# Patient Record
Sex: MALE | Race: WHITE | NOT HISPANIC OR LATINO | URBAN - NONMETROPOLITAN AREA
[De-identification: names, ages, dates, MRNs, and addresses within clinical notes are randomized per-mention and may not be internally consistent; named-entity substitution may affect disease eponyms.]

---

## 2018-06-27 ENCOUNTER — SURGERY (OUTPATIENT)
Dept: URBAN - NONMETROPOLITAN AREA SURGERY 7 | Facility: SURGERY | Age: 68
End: 2018-06-27
Payer: COMMERCIAL

## 2018-06-27 RX ORDER — OFLOXACIN 3 MG/ML
0.3 % SOLUTION/ DROPS OPHTHALMIC
Qty: 1 | Refills: 0 | Status: INACTIVE
Start: 2018-06-27 | End: 2018-10-01

## 2018-06-27 ASSESSMENT — INTRAOCULAR PRESSURE
OS: 17
OD: 17

## 2018-06-28 ENCOUNTER — POST OP (OUTPATIENT)
Dept: URBAN - NONMETROPOLITAN AREA CLINIC 6 | Facility: CLINIC | Age: 68
End: 2018-06-28

## 2018-06-28 DIAGNOSIS — Z96.1 PRESENCE OF INTRAOCULAR LENS: Primary | ICD-10-CM

## 2018-06-28 PROCEDURE — 99024 POSTOP FOLLOW-UP VISIT: CPT | Performed by: OPTOMETRIST

## 2018-06-28 ASSESSMENT — INTRAOCULAR PRESSURE
OS: 15
OD: 16

## 2018-07-11 ENCOUNTER — POST-OPERATIVE VISIT (OUTPATIENT)
Dept: URBAN - NONMETROPOLITAN AREA CLINIC 10 | Facility: CLINIC | Age: 68
End: 2018-07-11
Payer: MEDICAID

## 2018-07-11 DIAGNOSIS — Z09 ENCNTR FOR F/U EXAM AFT TRTMT FOR COND OTH THAN MALIG NEOPLM: Primary | ICD-10-CM

## 2018-07-11 PROCEDURE — 99024 POSTOP FOLLOW-UP VISIT: CPT | Performed by: OPTOMETRIST

## 2018-07-11 ASSESSMENT — INTRAOCULAR PRESSURE
OS: 17
OD: 21

## 2018-10-01 ENCOUNTER — FOLLOW UP ESTABLISHED (OUTPATIENT)
Dept: URBAN - NONMETROPOLITAN AREA CLINIC 6 | Facility: CLINIC | Age: 68
End: 2018-10-01
Payer: COMMERCIAL

## 2018-10-01 DIAGNOSIS — H52.4 PRESBYOPIA: ICD-10-CM

## 2018-10-01 DIAGNOSIS — H52.223 REGULAR ASTIGMATISM, BILATERAL: ICD-10-CM

## 2018-10-01 PROCEDURE — 92014 COMPRE OPH EXAM EST PT 1/>: CPT | Performed by: OPTOMETRIST

## 2018-10-01 PROCEDURE — 92015 DETERMINE REFRACTIVE STATE: CPT | Performed by: OPTOMETRIST

## 2018-10-01 RX ORDER — TIMOLOL MALEATE 5 MG/ML
0.5 % SOLUTION/ DROPS OPHTHALMIC
Qty: 1 | Refills: 2 | Status: INACTIVE
Start: 2018-10-01 | End: 2019-01-24

## 2018-10-01 ASSESSMENT — INTRAOCULAR PRESSURE
OS: 20
OD: 20

## 2018-10-01 ASSESSMENT — VISUAL ACUITY
OS: 20/20
OD: 20/30

## 2019-01-03 ENCOUNTER — FOLLOW UP ESTABLISHED (OUTPATIENT)
Dept: URBAN - NONMETROPOLITAN AREA CLINIC 6 | Facility: CLINIC | Age: 69
End: 2019-01-03
Payer: MEDICARE

## 2019-01-03 DIAGNOSIS — H25.13 AGE-RELATED NUCLEAR CATARACT, BILATERAL: Primary | ICD-10-CM

## 2019-01-03 PROCEDURE — 92004 COMPRE OPH EXAM NEW PT 1/>: CPT | Performed by: OPHTHALMOLOGY

## 2019-01-03 PROCEDURE — 92014 COMPRE OPH EXAM EST PT 1/>: CPT | Performed by: OPHTHALMOLOGY

## 2019-01-03 RX ORDER — ACETAZOLAMIDE 250 MG/1
250 MG TABLET ORAL
Qty: 4 | Refills: 0 | Status: INACTIVE
Start: 2019-01-03 | End: 2019-04-26

## 2019-01-03 RX ORDER — OFLOXACIN 3 MG/ML
0.3 % SOLUTION/ DROPS OPHTHALMIC
Qty: 1 | Refills: 1 | Status: INACTIVE
Start: 2019-01-03 | End: 2019-04-26

## 2019-01-03 RX ORDER — PREDNISOLONE ACETATE 10 MG/ML
1 % SUSPENSION/ DROPS OPHTHALMIC
Qty: 1 | Refills: 1 | Status: INACTIVE
Start: 2019-01-03 | End: 2019-01-24

## 2019-01-03 ASSESSMENT — INTRAOCULAR PRESSURE
OD: 20
OS: 18

## 2019-01-10 ENCOUNTER — Encounter (OUTPATIENT)
Dept: URBAN - NONMETROPOLITAN AREA CLINIC 6 | Facility: CLINIC | Age: 69
End: 2019-01-10
Payer: MEDICARE

## 2019-01-10 DIAGNOSIS — Z01.818 ENCOUNTER FOR OTHER PREPROCEDURAL EXAMINATION: Primary | ICD-10-CM

## 2019-01-10 PROCEDURE — 99213 OFFICE O/P EST LOW 20 MIN: CPT | Performed by: PHYSICIAN ASSISTANT

## 2019-01-23 ENCOUNTER — SURGERY (OUTPATIENT)
Dept: URBAN - NONMETROPOLITAN AREA SURGERY 1 | Facility: SURGERY | Age: 69
End: 2019-01-23
Payer: MEDICARE

## 2019-01-23 PROCEDURE — 66984 XCAPSL CTRC RMVL W/O ECP: CPT | Performed by: OPHTHALMOLOGY

## 2019-01-24 ENCOUNTER — POST OP (OUTPATIENT)
Dept: URBAN - NONMETROPOLITAN AREA CLINIC 6 | Facility: CLINIC | Age: 69
End: 2019-01-24

## 2019-01-24 PROCEDURE — 99024 POSTOP FOLLOW-UP VISIT: CPT | Performed by: OPTOMETRIST

## 2019-01-24 RX ORDER — TIMOLOL MALEATE 5 MG/ML
0.5 % SOLUTION/ DROPS OPHTHALMIC
Qty: 1 | Refills: 6 | Status: INACTIVE
Start: 2019-01-24 | End: 2019-04-26

## 2019-01-24 RX ORDER — LATANOPROST 50 UG/ML
0.005 % SOLUTION OPHTHALMIC
Qty: 1 | Refills: 6 | Status: INACTIVE
Start: 2019-01-24 | End: 2019-04-26

## 2019-01-24 RX ORDER — PREDNISOLONE ACETATE 10 MG/ML
1 % SUSPENSION/ DROPS OPHTHALMIC
Qty: 1 | Refills: 1 | Status: INACTIVE
Start: 2019-01-24 | End: 2019-04-26

## 2019-01-24 RX ORDER — LATANOPROST 50 UG/ML
0.005 % SOLUTION OPHTHALMIC
Qty: 1 | Refills: 1 | Status: INACTIVE
Start: 2019-01-24 | End: 2019-01-24

## 2019-01-24 ASSESSMENT — INTRAOCULAR PRESSURE: OD: 12

## 2019-02-05 ENCOUNTER — POST OP (OUTPATIENT)
Dept: URBAN - NONMETROPOLITAN AREA CLINIC 6 | Facility: CLINIC | Age: 69
End: 2019-02-05

## 2019-02-05 PROCEDURE — 99024 POSTOP FOLLOW-UP VISIT: CPT | Performed by: OPTOMETRIST

## 2019-02-05 ASSESSMENT — VISUAL ACUITY
OD: 20/40
OS: 20/20

## 2019-02-05 ASSESSMENT — INTRAOCULAR PRESSURE
OD: 17
OS: 15

## 2019-04-26 ENCOUNTER — POST OP (OUTPATIENT)
Dept: URBAN - NONMETROPOLITAN AREA CLINIC 6 | Facility: CLINIC | Age: 69
End: 2019-04-26

## 2019-04-26 PROCEDURE — 99024 POSTOP FOLLOW-UP VISIT: CPT | Performed by: OPTOMETRIST

## 2019-04-26 RX ORDER — TIMOLOL MALEATE 5 MG/ML
0.5 % SOLUTION/ DROPS OPHTHALMIC
Qty: 1 | Refills: 6 | Status: INACTIVE
Start: 2019-04-26 | End: 2020-05-14

## 2019-04-26 RX ORDER — LATANOPROST 50 UG/ML
0.005 % SOLUTION OPHTHALMIC
Qty: 1 | Refills: 6 | Status: INACTIVE
Start: 2019-04-26 | End: 2020-05-14

## 2019-04-26 ASSESSMENT — VISUAL ACUITY
OS: 20/20
OD: 20/50

## 2019-04-26 ASSESSMENT — INTRAOCULAR PRESSURE: OD: 19

## 2019-10-25 ENCOUNTER — FOLLOW UP ESTABLISHED (OUTPATIENT)
Dept: URBAN - NONMETROPOLITAN AREA CLINIC 6 | Facility: CLINIC | Age: 69
End: 2019-10-25
Payer: MEDICARE

## 2019-10-25 DIAGNOSIS — H40.2213 CHRONIC ANGLE-CLOSURE GLAUCOMA, RIGHT EYE, SEVERE STAGE: Primary | ICD-10-CM

## 2019-10-25 PROCEDURE — 92014 COMPRE OPH EXAM EST PT 1/>: CPT | Performed by: OPTOMETRIST

## 2019-10-25 PROCEDURE — 92083 EXTENDED VISUAL FIELD XM: CPT | Performed by: OPTOMETRIST

## 2019-10-25 ASSESSMENT — INTRAOCULAR PRESSURE
OD: 19
OS: 16

## 2019-10-25 ASSESSMENT — VISUAL ACUITY
OD: 20/40
OS: 20/20

## 2020-04-15 ENCOUNTER — FOLLOW UP ESTABLISHED (OUTPATIENT)
Dept: URBAN - NONMETROPOLITAN AREA CLINIC 6 | Facility: CLINIC | Age: 70
End: 2020-04-15
Payer: MEDICARE

## 2020-04-15 DIAGNOSIS — H40.2222 CHRONIC ANGLE-CLOSURE GLAUCOMA, LEFT EYE, MODERATE STAGE: Primary | ICD-10-CM

## 2020-04-15 PROCEDURE — 92133 CPTRZD OPH DX IMG PST SGM ON: CPT | Performed by: OPHTHALMOLOGY

## 2020-04-15 PROCEDURE — 99214 OFFICE O/P EST MOD 30 MIN: CPT | Performed by: OPHTHALMOLOGY

## 2020-04-15 RX ORDER — BRIMONIDINE TARTRATE 2 MG/ML
0.2 % SOLUTION/ DROPS OPHTHALMIC
Qty: 1 | Refills: 3 | Status: INACTIVE
Start: 2020-04-15 | End: 2020-09-10

## 2020-04-15 ASSESSMENT — INTRAOCULAR PRESSURE
OD: 18
OS: 18

## 2020-05-14 ENCOUNTER — FOLLOW UP ESTABLISHED (OUTPATIENT)
Dept: URBAN - NONMETROPOLITAN AREA CLINIC 6 | Facility: CLINIC | Age: 70
End: 2020-05-14
Payer: MEDICARE

## 2020-05-14 PROCEDURE — 92014 COMPRE OPH EXAM EST PT 1/>: CPT | Performed by: OPHTHALMOLOGY

## 2020-05-14 RX ORDER — LATANOPROST 50 UG/ML
0.005 % SOLUTION OPHTHALMIC
Qty: 1 | Refills: 6 | Status: INACTIVE
Start: 2020-05-14 | End: 2021-05-17

## 2020-05-14 RX ORDER — TIMOLOL MALEATE 5 MG/ML
0.5 % SOLUTION/ DROPS OPHTHALMIC
Qty: 1 | Refills: 6 | Status: INACTIVE
Start: 2020-05-14 | End: 2020-12-10

## 2020-05-14 ASSESSMENT — VISUAL ACUITY
OS: 20/20
OD: 20/30

## 2020-05-14 ASSESSMENT — KERATOMETRY
OS: 44.52
OD: 44.30

## 2020-05-14 ASSESSMENT — INTRAOCULAR PRESSURE
OS: 19
OD: 17

## 2020-08-13 ENCOUNTER — FOLLOW UP ESTABLISHED (OUTPATIENT)
Dept: URBAN - NONMETROPOLITAN AREA CLINIC 6 | Facility: CLINIC | Age: 70
End: 2020-08-13
Payer: MEDICARE

## 2020-08-13 DIAGNOSIS — H26.491 OTHER SECONDARY CATARACT, RIGHT EYE: ICD-10-CM

## 2020-08-13 PROCEDURE — 92012 INTRM OPH EXAM EST PATIENT: CPT | Performed by: OPHTHALMOLOGY

## 2020-08-13 ASSESSMENT — INTRAOCULAR PRESSURE
OD: 22
OS: 23

## 2020-08-13 ASSESSMENT — VISUAL ACUITY
OS: 20/20
OD: 20/30

## 2020-09-02 ENCOUNTER — SURGERY (OUTPATIENT)
Dept: URBAN - NONMETROPOLITAN AREA SURGERY 1 | Facility: SURGERY | Age: 70
End: 2020-09-02
Payer: COMMERCIAL

## 2020-09-02 PROCEDURE — 66821 AFTER CATARACT LASER SURGERY: CPT | Performed by: OPHTHALMOLOGY

## 2020-09-10 ENCOUNTER — POST OP (OUTPATIENT)
Dept: URBAN - NONMETROPOLITAN AREA CLINIC 6 | Facility: CLINIC | Age: 70
End: 2020-09-10
Payer: MEDICARE

## 2020-09-10 PROCEDURE — 99024 POSTOP FOLLOW-UP VISIT: CPT | Performed by: OPHTHALMOLOGY

## 2020-09-10 ASSESSMENT — INTRAOCULAR PRESSURE
OD: 18
OS: 19

## 2020-09-10 ASSESSMENT — VISUAL ACUITY
OS: 20/25
OD: 20/30

## 2020-12-10 ENCOUNTER — FOLLOW UP ESTABLISHED (OUTPATIENT)
Dept: URBAN - NONMETROPOLITAN AREA CLINIC 6 | Facility: CLINIC | Age: 70
End: 2020-12-10
Payer: COMMERCIAL

## 2020-12-10 PROCEDURE — 92014 COMPRE OPH EXAM EST PT 1/>: CPT | Performed by: OPHTHALMOLOGY

## 2020-12-10 PROCEDURE — 92083 EXTENDED VISUAL FIELD XM: CPT | Performed by: OPHTHALMOLOGY

## 2020-12-10 RX ORDER — TIMOLOL MALEATE 5 MG/ML
0.5 % SOLUTION/ DROPS OPHTHALMIC
Qty: 1 | Refills: 6 | Status: INACTIVE
Start: 2020-12-10 | End: 2021-06-07

## 2020-12-10 RX ORDER — BRIMONIDINE TARTRATE 2 MG/ML
0.2 % SOLUTION OPHTHALMIC
Qty: 5 | Refills: 3 | Status: ACTIVE
Start: 2020-12-10

## 2020-12-10 ASSESSMENT — INTRAOCULAR PRESSURE
OS: 22
OD: 18

## 2021-02-11 ENCOUNTER — FOLLOW UP ESTABLISHED (OUTPATIENT)
Dept: URBAN - NONMETROPOLITAN AREA CLINIC 6 | Facility: CLINIC | Age: 71
End: 2021-02-11
Payer: COMMERCIAL

## 2021-02-11 PROCEDURE — 99213 OFFICE O/P EST LOW 20 MIN: CPT | Performed by: OPHTHALMOLOGY

## 2021-02-11 ASSESSMENT — INTRAOCULAR PRESSURE
OS: 16
OD: 16

## 2021-04-09 ENCOUNTER — OFFICE VISIT (OUTPATIENT)
Dept: URBAN - NONMETROPOLITAN AREA CLINIC 10 | Facility: CLINIC | Age: 71
End: 2021-04-09
Payer: COMMERCIAL

## 2021-04-09 DIAGNOSIS — H25.12 AGE-RELATED NUCLEAR CATARACT, LEFT EYE: ICD-10-CM

## 2021-04-09 PROCEDURE — 92014 COMPRE OPH EXAM EST PT 1/>: CPT | Performed by: OPHTHALMOLOGY

## 2021-04-09 RX ORDER — BRINZOLAMIDE/BRIMONIDINE TARTRATE 10; 2 MG/ML; MG/ML
SUSPENSION/ DROPS OPHTHALMIC
Qty: 5 | Refills: 3 | Status: INACTIVE
Start: 2021-04-09 | End: 2021-07-12

## 2021-04-09 ASSESSMENT — VISUAL ACUITY
OS: 20/25
OD: 20/40

## 2021-04-09 ASSESSMENT — INTRAOCULAR PRESSURE
OS: 16
OD: 15

## 2021-04-09 NOTE — IMPRESSION/PLAN
Impression: Chronic angle-closure glaucoma, right eye, severe stage Plan: IOP stable today. Continue Latanoprost QHS OU, Timolol BID OU. Recommend stopping Brimondine 0.2% and START Simbrinza TID OU.

## 2021-07-02 ENCOUNTER — OFFICE VISIT (OUTPATIENT)
Dept: URBAN - NONMETROPOLITAN AREA CLINIC 10 | Facility: CLINIC | Age: 71
End: 2021-07-02
Payer: COMMERCIAL

## 2021-07-02 PROCEDURE — 99213 OFFICE O/P EST LOW 20 MIN: CPT | Performed by: OPTOMETRIST

## 2021-07-02 ASSESSMENT — INTRAOCULAR PRESSURE
OS: 18
OD: 14
OS: 14

## 2021-11-18 ENCOUNTER — OFFICE VISIT (OUTPATIENT)
Dept: URBAN - NONMETROPOLITAN AREA CLINIC 10 | Facility: CLINIC | Age: 71
End: 2021-11-18
Payer: COMMERCIAL

## 2021-11-18 DIAGNOSIS — H40.1121 PRIMARY OPEN-ANGLE GLAUCOMA, MILD STAGE, LEFT EYE: ICD-10-CM

## 2021-11-18 PROCEDURE — 99214 OFFICE O/P EST MOD 30 MIN: CPT | Performed by: STUDENT IN AN ORGANIZED HEALTH CARE EDUCATION/TRAINING PROGRAM

## 2021-11-18 PROCEDURE — 92133 CPTRZD OPH DX IMG PST SGM ON: CPT | Performed by: STUDENT IN AN ORGANIZED HEALTH CARE EDUCATION/TRAINING PROGRAM

## 2021-11-18 ASSESSMENT — INTRAOCULAR PRESSURE
OS: 26
OS: 24
OD: 17
OD: 20

## 2021-11-18 NOTE — IMPRESSION/PLAN
Impression: Chronic angle-closure glaucoma, right eye, severe stage: J75.5522. Plan: Continue all glaucoma drops as directed (Simbrinza TIDTimolol bid Latanoprost qhs OU). Patient educated on importance of good compliance with glaucoma drops and risk of IOP spike and irreversible vision loss with non-compliance. IOP uncontrolled on current drops. Recommend consult with either Dr. Corbin Palacios in [facility] or Dr. Puneet Newman of [address] [place] [address] Consultants regarding further glaucoma surgery/drops options.

## 2022-04-07 ENCOUNTER — OFFICE VISIT (OUTPATIENT)
Dept: URBAN - NONMETROPOLITAN AREA CLINIC 10 | Facility: CLINIC | Age: 72
End: 2022-04-07
Payer: COMMERCIAL

## 2022-04-07 PROCEDURE — 92083 EXTENDED VISUAL FIELD XM: CPT | Performed by: STUDENT IN AN ORGANIZED HEALTH CARE EDUCATION/TRAINING PROGRAM

## 2022-04-07 PROCEDURE — 92133 CPTRZD OPH DX IMG PST SGM ON: CPT | Performed by: STUDENT IN AN ORGANIZED HEALTH CARE EDUCATION/TRAINING PROGRAM

## 2022-04-07 PROCEDURE — 92014 COMPRE OPH EXAM EST PT 1/>: CPT | Performed by: STUDENT IN AN ORGANIZED HEALTH CARE EDUCATION/TRAINING PROGRAM

## 2022-04-07 ASSESSMENT — INTRAOCULAR PRESSURE
OS: 19
OD: 19
OS: 23
OD: 17

## 2022-04-07 NOTE — IMPRESSION/PLAN
Impression: Chronic angle-closure glaucoma, right eye, severe stage: B00.7551. Plan: Patient ed on findings. IOP OS>OD not controlled on current drop therapy of Latanoprost QHS, Timolol BID, and Simbrinza TID OU. Recommend patient be seen next available glaucoma consultation with Dr. Shalom Flores or Dr. Calos Munoz of [facility] consultants. Discussed possibility of glaucoma surgery such as filtering bleb being placed. Patient reports that he tried calling Dr. Shalom Flores but was told there was a long wait. Pt ed that progression is noted since 2017 given tests done today. Will monitor. Placeholder exam placed 4 months for IOP check and VF 24-2. Consider adding Vyzulta OU at next exam if unable to be seen before then.

## 2022-08-11 ENCOUNTER — OFFICE VISIT (OUTPATIENT)
Dept: URBAN - NONMETROPOLITAN AREA CLINIC 10 | Facility: CLINIC | Age: 72
End: 2022-08-11
Payer: COMMERCIAL

## 2022-08-11 DIAGNOSIS — H40.2213 CHRONIC ANGLE-CLOSURE GLAUCOMA, RIGHT EYE, SEVERE STAGE: Primary | ICD-10-CM

## 2022-08-11 DIAGNOSIS — H40.1122 PRIMARY OPEN-ANGLE GLAUCOMA, MODERATE STAGE, LEFT EYE: ICD-10-CM

## 2022-08-11 PROCEDURE — 92083 EXTENDED VISUAL FIELD XM: CPT | Performed by: STUDENT IN AN ORGANIZED HEALTH CARE EDUCATION/TRAINING PROGRAM

## 2022-08-11 PROCEDURE — 99213 OFFICE O/P EST LOW 20 MIN: CPT | Performed by: STUDENT IN AN ORGANIZED HEALTH CARE EDUCATION/TRAINING PROGRAM

## 2022-08-11 ASSESSMENT — INTRAOCULAR PRESSURE
OD: 17
OS: 21
OD: 16

## 2022-08-11 NOTE — IMPRESSION/PLAN
Impression: Chronic angle-closure glaucoma, right eye, severe stage: D88.0643. Plan: Patient ed on findings. IOP OS>OD not controlled on current drop therapy of Latanoprost QHS, Timolol BID, and Simbrinza TID OU. Patient ed that condition stable since April. Will monitor. Patient reports going to [facility] (unknown clinic) and they couldn't see him that day so he has not seen a glaucoma specialist. Will recommend consult with glaucoma specialist in [facility] if ANY progression noted in the future. Consider adding Vyzulta OU at next exam if pressures remain high.

## 2023-03-01 ENCOUNTER — OFFICE VISIT (OUTPATIENT)
Dept: URBAN - NONMETROPOLITAN AREA CLINIC 10 | Facility: CLINIC | Age: 73
End: 2023-03-01
Payer: COMMERCIAL

## 2023-03-01 DIAGNOSIS — H40.2213 CHRONIC ANGLE-CLOSURE GLAUCOMA, RIGHT EYE, SEVERE STAGE: ICD-10-CM

## 2023-03-01 DIAGNOSIS — H40.1122 PRIMARY OPEN-ANGLE GLAUCOMA, LEFT EYE, MODERATE STAGE: Primary | ICD-10-CM

## 2023-03-01 DIAGNOSIS — H25.812 COMBINED FORMS OF AGE-RELATED CATARACT, LEFT EYE: ICD-10-CM

## 2023-03-01 DIAGNOSIS — H52.4 PRESBYOPIA: ICD-10-CM

## 2023-03-01 PROCEDURE — 92133 CPTRZD OPH DX IMG PST SGM ON: CPT | Performed by: STUDENT IN AN ORGANIZED HEALTH CARE EDUCATION/TRAINING PROGRAM

## 2023-03-01 PROCEDURE — 92014 COMPRE OPH EXAM EST PT 1/>: CPT | Performed by: STUDENT IN AN ORGANIZED HEALTH CARE EDUCATION/TRAINING PROGRAM

## 2023-03-01 ASSESSMENT — VISUAL ACUITY
OS: 20/25
OD: 20/40

## 2023-03-01 ASSESSMENT — INTRAOCULAR PRESSURE
OS: 19
OD: 19

## 2023-06-06 ENCOUNTER — OFFICE VISIT (OUTPATIENT)
Dept: URBAN - NONMETROPOLITAN AREA CLINIC 6 | Facility: CLINIC | Age: 73
End: 2023-06-06
Payer: COMMERCIAL

## 2023-06-06 DIAGNOSIS — H52.4 PRESBYOPIA: Primary | ICD-10-CM

## 2023-06-06 PROCEDURE — 92015 DETERMINE REFRACTIVE STATE: CPT | Performed by: STUDENT IN AN ORGANIZED HEALTH CARE EDUCATION/TRAINING PROGRAM

## 2023-06-06 ASSESSMENT — VISUAL ACUITY
OS: 20/30
OD: 20/40

## 2023-06-06 NOTE — IMPRESSION/PLAN
Impression: Presbyopia: H52.4. Plan: Patient educated on refractive error. New glasses prescription dispensed to patient, expires 1 year. Adaptation period explained. Doctor Remake.

## 2023-07-17 NOTE — IMPRESSION/PLAN
Impression: Chronic angle-closure glaucoma, right eye, severe stage: R91.1027. Plan: Patient ed on findings. IOP OS>OD not controlled on current drop therapy of Latanoprost QHS, Timolol BID, and Simbrinza TID OU. Patient ed that condition stable since April. Will monitor. Patient reports going to [facility] (unknown clinic) and they couldn't see him that day so he has not seen a glaucoma specialist. Will recommend consult with glaucoma specialist in [facility] if ANY progression noted in the future. Consider adding Vyzulta OU at next exam if pressures remain high.
Impression: Combined forms of age-related cataract, left eye: H25.812. Plan: Patient counseled on findings. No treatment currently recommended due to visual acuity function level. Patient will monitor vision changes and contact us with any decrease in vision, will re-evaluate cataract on return visit.
Impression: Primary open-angle glaucoma, left eye, moderate stage: Y11.9534.  Plan: see above
yes

## 2025-02-13 ENCOUNTER — OFFICE VISIT (OUTPATIENT)
Dept: URBAN - NONMETROPOLITAN AREA CLINIC 6 | Facility: CLINIC | Age: 75
End: 2025-02-13
Payer: COMMERCIAL

## 2025-02-13 DIAGNOSIS — H25.12 AGE-RELATED NUCLEAR CATARACT, LEFT EYE: Primary | ICD-10-CM

## 2025-02-13 DIAGNOSIS — H40.1133 PRIMARY OPEN-ANGLE GLAUCOMA, SEVERE STAGE, BILATERAL: ICD-10-CM

## 2025-02-13 PROCEDURE — 99204 OFFICE O/P NEW MOD 45 MIN: CPT | Performed by: OPHTHALMOLOGY

## 2025-02-13 PROCEDURE — 92020 GONIOSCOPY: CPT | Performed by: OPHTHALMOLOGY

## 2025-02-13 RX ORDER — BRIMONIDINE TARTRATE 2 MG/ML
0.2 % SOLUTION/ DROPS OPHTHALMIC
Qty: 5 | Refills: 3 | Status: ACTIVE
Start: 2025-02-13

## 2025-02-13 ASSESSMENT — KERATOMETRY
OD: 43.84
OS: 44.38

## 2025-02-13 ASSESSMENT — INTRAOCULAR PRESSURE
OS: 27
OD: 22
OS: 25
OD: 19
OS: 35

## 2025-02-13 ASSESSMENT — VISUAL ACUITY
OD: 20/40
OS: 20/30

## 2025-02-26 ENCOUNTER — SURGERY (OUTPATIENT)
Dept: URBAN - NONMETROPOLITAN AREA SURGERY 1 | Facility: SURGERY | Age: 75
End: 2025-02-26
Payer: COMMERCIAL

## 2025-02-26 PROCEDURE — 66761 REVISION OF IRIS: CPT | Performed by: OPHTHALMOLOGY

## 2025-02-26 RX ORDER — PREDNISOLONE ACETATE 10 MG/ML
1 % SUSPENSION/ DROPS OPHTHALMIC
Qty: 5 | Refills: 0 | Status: ACTIVE
Start: 2025-02-26

## 2025-02-27 ENCOUNTER — POST-OPERATIVE VISIT (OUTPATIENT)
Dept: URBAN - NONMETROPOLITAN AREA CLINIC 6 | Facility: CLINIC | Age: 75
End: 2025-02-27
Payer: COMMERCIAL

## 2025-02-27 DIAGNOSIS — Z48.810 ENCOUNTER FOR SURGICAL AFTERCARE FOLLOWING SURGERY ON A SENSE ORGAN: Primary | ICD-10-CM

## 2025-02-27 PROCEDURE — 99024 POSTOP FOLLOW-UP VISIT: CPT | Performed by: OPTOMETRIST

## 2025-02-27 ASSESSMENT — INTRAOCULAR PRESSURE
OD: 15
OS: 19

## 2025-03-27 ENCOUNTER — OFFICE VISIT (OUTPATIENT)
Dept: URBAN - NONMETROPOLITAN AREA CLINIC 6 | Facility: CLINIC | Age: 75
End: 2025-03-27
Payer: COMMERCIAL

## 2025-03-27 DIAGNOSIS — H40.1133 PRIMARY OPEN-ANGLE GLAUCOMA, SEVERE STAGE, BILATERAL: ICD-10-CM

## 2025-03-27 DIAGNOSIS — H25.12 AGE-RELATED NUCLEAR CATARACT, LEFT EYE: Primary | ICD-10-CM

## 2025-03-27 PROCEDURE — 99214 OFFICE O/P EST MOD 30 MIN: CPT | Performed by: OPHTHALMOLOGY

## 2025-03-27 RX ORDER — DICLOFENAC SODIUM 1 MG/ML
0.1 % SOLUTION/ DROPS OPHTHALMIC
Qty: 5 | Refills: 1 | Status: ACTIVE
Start: 2025-03-27

## 2025-03-27 ASSESSMENT — VISUAL ACUITY
OS: 20/50
OD: 20/40

## 2025-03-27 ASSESSMENT — KERATOMETRY
OS: 44.52
OD: 44.12

## 2025-03-27 ASSESSMENT — INTRAOCULAR PRESSURE
OD: 10
OS: 9

## 2025-04-02 ENCOUNTER — SURGERY (OUTPATIENT)
Dept: URBAN - NONMETROPOLITAN AREA SURGERY 1 | Facility: SURGERY | Age: 75
End: 2025-04-02
Payer: COMMERCIAL

## 2025-04-02 DIAGNOSIS — H57.03 MIOSIS: Primary | ICD-10-CM

## 2025-04-02 PROCEDURE — 66982 XCAPSL CTRC RMVL CPLX WO ECP: CPT | Performed by: OPHTHALMOLOGY

## 2025-05-08 ENCOUNTER — POST-OPERATIVE VISIT (OUTPATIENT)
Dept: URBAN - NONMETROPOLITAN AREA CLINIC 6 | Facility: CLINIC | Age: 75
End: 2025-05-08
Payer: COMMERCIAL

## 2025-05-08 DIAGNOSIS — Z96.1 PRESENCE OF INTRAOCULAR LENS: Primary | ICD-10-CM

## 2025-05-08 DIAGNOSIS — Z48.810 ENCOUNTER FOR SURGICAL AFTERCARE FOLLOWING SURGERY ON THE SENSE ORGANS: ICD-10-CM

## 2025-05-08 DIAGNOSIS — H52.222 REGULAR ASTIGMATISM, LEFT EYE: ICD-10-CM

## 2025-05-08 PROCEDURE — 92015 DETERMINE REFRACTIVE STATE: CPT | Performed by: OPHTHALMOLOGY

## 2025-05-08 PROCEDURE — 99024 POSTOP FOLLOW-UP VISIT: CPT | Performed by: OPHTHALMOLOGY

## 2025-05-08 PROCEDURE — 92134 CPTRZ OPH DX IMG PST SGM RTA: CPT | Performed by: OPHTHALMOLOGY

## 2025-05-08 ASSESSMENT — INTRAOCULAR PRESSURE
OS: 12
OD: 11

## 2025-05-08 ASSESSMENT — VISUAL ACUITY: OS: 20/30

## 2025-06-26 ENCOUNTER — POST-OPERATIVE VISIT (OUTPATIENT)
Dept: URBAN - NONMETROPOLITAN AREA CLINIC 6 | Facility: CLINIC | Age: 75
End: 2025-06-26
Payer: COMMERCIAL

## 2025-06-26 DIAGNOSIS — Z96.1 PRESENCE OF INTRAOCULAR LENS: Primary | ICD-10-CM

## 2025-06-26 PROCEDURE — 99024 POSTOP FOLLOW-UP VISIT: CPT | Performed by: OPHTHALMOLOGY

## 2025-06-26 RX ORDER — LATANOPROST 50 UG/ML
0.005 % SOLUTION OPHTHALMIC
Qty: 5 | Refills: 3 | Status: ACTIVE
Start: 2025-06-26

## 2025-06-26 RX ORDER — BRIMONIDINE TARTRATE 2 MG/ML
0.2 % SOLUTION/ DROPS OPHTHALMIC
Qty: 5 | Refills: 3 | Status: ACTIVE
Start: 2025-06-26

## 2025-06-26 RX ORDER — DORZOLAMIDE HYDROCHLORIDE AND TIMOLOL MALEATE 20; 5 MG/ML; MG/ML
SOLUTION/ DROPS OPHTHALMIC
Qty: 5 | Refills: 3 | Status: ACTIVE
Start: 2025-06-26

## 2025-06-26 ASSESSMENT — INTRAOCULAR PRESSURE
OD: 14
OS: 14

## 2025-07-14 ENCOUNTER — OFFICE VISIT (OUTPATIENT)
Dept: URBAN - NONMETROPOLITAN AREA CLINIC 6 | Facility: CLINIC | Age: 75
End: 2025-07-14
Payer: COMMERCIAL

## 2025-07-14 DIAGNOSIS — H52.222 REGULAR ASTIGMATISM, LEFT EYE: ICD-10-CM

## 2025-07-14 DIAGNOSIS — H40.1133 PRIMARY OPEN-ANGLE GLAUCOMA, SEVERE STAGE, BILATERAL: Primary | ICD-10-CM

## 2025-07-14 DIAGNOSIS — Z96.1 PRESENCE OF INTRAOCULAR LENS: ICD-10-CM

## 2025-07-14 PROCEDURE — 92015 DETERMINE REFRACTIVE STATE: CPT | Performed by: OPTOMETRIST

## 2025-07-14 PROCEDURE — 99213 OFFICE O/P EST LOW 20 MIN: CPT | Performed by: OPTOMETRIST

## 2025-07-14 ASSESSMENT — KERATOMETRY
OS: 44.15
OD: 43.91

## 2025-07-14 ASSESSMENT — INTRAOCULAR PRESSURE
OD: 9
OS: 6

## 2025-07-14 ASSESSMENT — VISUAL ACUITY
OS: 20/30
OD: 20/25